# Patient Record
Sex: MALE | Race: WHITE | NOT HISPANIC OR LATINO | Employment: FULL TIME | URBAN - METROPOLITAN AREA
[De-identification: names, ages, dates, MRNs, and addresses within clinical notes are randomized per-mention and may not be internally consistent; named-entity substitution may affect disease eponyms.]

---

## 2018-07-11 ENCOUNTER — HOSPITAL ENCOUNTER (EMERGENCY)
Facility: HOSPITAL | Age: 43
Discharge: HOME | End: 2018-07-11

## 2018-07-11 VITALS
RESPIRATION RATE: 16 BRPM | OXYGEN SATURATION: 94 % | SYSTOLIC BLOOD PRESSURE: 148 MMHG | BODY MASS INDEX: 33.72 KG/M2 | DIASTOLIC BLOOD PRESSURE: 94 MMHG | WEIGHT: 235 LBS | HEART RATE: 100 BPM | TEMPERATURE: 98 F

## 2018-07-11 DIAGNOSIS — I10 ELEVATED BLOOD PRESSURE READING WITH DIAGNOSIS OF HYPERTENSION: Primary | ICD-10-CM

## 2018-07-11 DIAGNOSIS — M25.561 RIGHT KNEE PAIN: ICD-10-CM

## 2018-07-11 PROCEDURE — 99283 EMERGENCY DEPT VISIT LOW MDM: CPT | Mod: 25

## 2018-07-11 PROCEDURE — 29505 APPLICATION LONG LEG SPLINT: CPT | Mod: RT

## 2018-07-11 NOTE — ED PROVIDER NOTES
"Encounter Date: 7/11/2018  SCRIBE #1 NOTE: I, Elvis Dunn, am scribing for, and in the presence of,  Olu THAPA. I have scribed the entire note.        History     Chief Complaint   Patient presents with    Knee Pain     Pt states," My right knee has been hurting for ten days."     The patient is a 43 y.o. male with co-morbidities including: HTN, thyroid, Gout who presents to the ED with a complaint of right knee pain for 10 days.  He states it is painful constantly, aching, and worse with activity. He denies any shortness of breath,. Leg swelling, fevers, or altered sensation in the leg. He reports he was in the waiting room for his orthopedist office due to waiting too long and so he left and came directly here to be seen. The patient denies taking any pain medicine for this. He states he doesn't want to take any medicine today for this because he is on 4 medicines already and refuses to be taking a 5th medicine.  The patient was at an orthopedics office at [facility] when he got upset because he had to wait too long and decided to come to the ER.      The history is provided by the patient. No  was used.   Leg Pain    There was no injury mechanism. The incident occurred several days ago. The pain is present in the right knee. The quality of the pain is described as aching. The pain is at a severity of 8/10 (Patient describes his pain as an aching sharp pain). The pain has been constant since onset. Pertinent negatives include no numbness. He reports no foreign bodies present.     Review of patient's allergies indicates:  No Known Allergies  Past Medical History:   Diagnosis Date    Gout     Hypertension     Thyroid disease      Past Surgical History:   Procedure Laterality Date    EYE SURGERY      NOSE SURGERY      VASECTOMY       History reviewed. No pertinent family history.  Social History   Substance Use Topics    Smoking status: Current Every Day Smoker     Types: " Cigarettes    Smokeless tobacco: Never Used    Alcohol use Not on file     Review of Systems   Constitutional: Negative.  Negative for chills and fever.   HENT: Negative.  Negative for congestion, sinus pressure and sore throat.    Eyes: Negative.  Negative for pain and discharge.   Respiratory: Negative.  Negative for cough.    Cardiovascular: Negative.  Negative for chest pain.   Gastrointestinal: Negative.  Negative for abdominal pain, diarrhea, nausea and vomiting.   Genitourinary: Negative.  Negative for dysuria, genital sores, penile pain, scrotal swelling and testicular pain.   Musculoskeletal: Negative for joint swelling and neck pain.        Right knee pain   Skin: Negative.  Negative for color change, rash and wound.   Allergic/Immunologic: Negative.    Neurological: Negative for weakness and numbness.   Psychiatric/Behavioral: Negative.  Negative for behavioral problems, self-injury and suicidal ideas. The patient is not hyperactive.    All other systems reviewed and are negative.      Physical Exam     Initial Vitals [07/11/18 1524]   BP Pulse Resp Temp SpO2   (!) 149/104 (!) 111 16 98 °F (36.7 °C) 99 %      MAP       --         Physical Exam    Nursing note and vitals reviewed.  Constitutional: He appears well-developed and well-nourished.   HENT:   Head: Normocephalic and atraumatic.   Eyes: EOM are normal. Pupils are equal, round, and reactive to light.   Neck: Normal range of motion. Neck supple.   Cardiovascular: Normal rate, regular rhythm, normal heart sounds and intact distal pulses.   Pulmonary/Chest: Breath sounds normal. No respiratory distress. He has no wheezes.   Abdominal: Soft.   Musculoskeletal:        Right lower leg: He exhibits no tenderness, no bony tenderness, no swelling, no edema, no deformity and no laceration.        Left lower leg: He exhibits no tenderness, no bony tenderness, no swelling, no edema, no deformity and no laceration.   No apparent knee effusion, no anterior  knee tenderness, patella is in a normal position, he reports pain with touching the knee but it is distractible.  Bilateral calf circumferences are 44.5 cm each.  The patient's Wells criteria for DVT score is a 0.   Neurological: He is alert and oriented to person, place, and time.   Skin: Skin is warm and dry.   Psychiatric: He has a normal mood and affect. Thought content normal.         ED Course   Procedures  Labs Reviewed - No data to display       Imaging Results          X-Ray Knee 3 View Right (Final result)  Result time 07/11/18 15:44:08    Final result by Herb Acosta MD (07/11/18 15:44:08)                 Impression:      1. No acute displaced fracture or dislocation of the knee.      Electronically signed by: Herb Acosta MD  Date:    07/11/2018  Time:    15:44             Narrative:    EXAMINATION:  XR KNEE 3 VIEW RIGHT    CLINICAL HISTORY:  Pain in right knee    TECHNIQUE:  AP, lateral, and Merchant views of the right knee were performed.    COMPARISON:  None    FINDINGS:  Three views.    No acute displaced fracture or dislocation of the knee.  No radiopaque foreign body.  No large knee joint effusion.                                 Medical Decision Making:   Initial Assessment:   Right knee pain    Differential Diagnosis:   Fracture, dislocation, Baker cyst  ED Management:  Repeat pulse is 99 beats per min.  Patient reports that he did not take his blood pressure medicine today.  Patient denies headache or blurred vision or chest pain or dizziness.  Patient is instructed to take his blood pressure medicine when he returns home.  Patient will be referred to [facility] per his request and will contact them for a follow-up appointment at his convenience.  Patient refused any prescriptions for pain medicine.  Patient has a knee immobilizer that was applied in the ER as well as ice pack.  Patient is instructed to implement RICE.  Patient is instructed to return to the ER as needed if  symptoms worsen or fail to improve.  Patient verbalized understanding of discharge instructions and treatment plan.            Scribe Attestation:   Scribe #1: I performed the above scribed service and the documentation accurately describes the services I performed. I attest to the accuracy of the note.    Attending Attestation:           Physician Attestation for Scribe:  Physician Attestation Statement for Scribe #1: I, Tussaint Battley FNP, reviewed documentation, as scribed by Elvis Dunn in my presence, and it is both accurate and complete.                    Clinical Impression:   The primary encounter diagnosis was Elevated blood pressure reading with diagnosis of hypertension. A diagnosis of Right knee pain was also pertinent to this visit.                             Toussaint Battley III, FNP  07/11/18 2155

## 2020-10-05 ENCOUNTER — TELEPHONE (OUTPATIENT)
Dept: UROLOGY | Facility: CLINIC | Age: 45
End: 2020-10-05

## 2020-10-05 NOTE — TELEPHONE ENCOUNTER
Called to speak to pt about appt. appt scheduled for Wednesday at 930am. Requested pt call back for additional information.

## 2020-10-26 ENCOUNTER — TELEPHONE (OUTPATIENT)
Dept: UROLOGY | Facility: CLINIC | Age: 45
End: 2020-10-26

## 2020-10-26 NOTE — TELEPHONE ENCOUNTER
Spoke to pt and pts so. Informed appt scheduled with  on 11/2/20 at 930am. Procedure at [facility] 11/4/20 at 2pm pending labs/payment ok from  at office visit. Both verbalized understanding.

## 2020-10-27 ENCOUNTER — TELEPHONE (OUTPATIENT)
Dept: UROLOGY | Facility: CLINIC | Age: 45
End: 2020-10-27

## 2020-10-27 NOTE — TELEPHONE ENCOUNTER
"----- Message from Gilson Avendaño sent at 10/27/2020  1:06 PM CDT -----  Hello,    Test results received from [facility], in regards to patient's appt on 11/02/20. Results were scanned into  under "Dr. Michelet Parikh [facility] Results".    Please let me know if I can assist further.    Thank you,    Gilson Avendaño  [facility]     "

## 2020-11-02 ENCOUNTER — OFFICE VISIT (OUTPATIENT)
Dept: UROLOGY | Facility: CLINIC | Age: 45
End: 2020-11-02

## 2020-11-02 VITALS
BODY MASS INDEX: 38.5 KG/M2 | HEIGHT: 70 IN | DIASTOLIC BLOOD PRESSURE: 95 MMHG | SYSTOLIC BLOOD PRESSURE: 138 MMHG | HEART RATE: 75 BPM | WEIGHT: 268.94 LBS

## 2020-11-02 DIAGNOSIS — E29.1 TESTICULAR FAILURE: Primary | ICD-10-CM

## 2020-11-02 PROCEDURE — 99999 PR PBB SHADOW E&M-EST. PATIENT-LVL III: ICD-10-PCS | Mod: PBBFAC,,, | Performed by: UROLOGY

## 2020-11-02 PROCEDURE — 99204 OFFICE O/P NEW MOD 45 MIN: CPT | Mod: S$PBB,,, | Performed by: UROLOGY

## 2020-11-02 PROCEDURE — 99213 OFFICE O/P EST LOW 20 MIN: CPT | Mod: PBBFAC | Performed by: UROLOGY

## 2020-11-02 PROCEDURE — 99999 PR PBB SHADOW E&M-EST. PATIENT-LVL III: CPT | Mod: PBBFAC,,, | Performed by: UROLOGY

## 2020-11-02 PROCEDURE — 99204 PR OFFICE/OUTPT VISIT, NEW, LEVL IV, 45-59 MIN: ICD-10-PCS | Mod: S$PBB,,, | Performed by: UROLOGY

## 2020-11-02 RX ORDER — OMEPRAZOLE 10 MG/1
10 CAPSULE, DELAYED RELEASE ORAL
COMMUNITY

## 2020-11-02 NOTE — PROGRESS NOTES
HISTORY OF PRESENT ILLNESS:    Mr. Aly is a 45 y.o. male who is engaged to his fiancee. Mr. Aly underwent an elective bilateral vasectomy 16 years ago without complications. Prior to that he had achieved 0 pregnancies without difficulty. The couple is now interested in achieving a pregnancy.  He had a GLASGOW on him in 2016 by Dr. Yap.  Unsure of the side.  I saw him back in 2016, but he didn't follow back up with me for surgery.    Did IVF with 1 frozen embryo.  Has not transferred.    His fiancee is 40 years old. She has regular menses and has had 0 prior pregnancies. She sees Dr. Porter.    She has no other underlying gynecological problems other than low ovarian reserve.    The couple has undergone in-vitro fertilization (IVF) as above.  No intrauterine insemination (IUI), or other assisted reproductive techniques.    Mr. Aly denies any history of exposure to harmful chemicals, toxins, or radiation. No history of urological trauma or injuries. No history of post-pubertal mumps, testicular torsion, epididymitis, prostatitis, or sexually transmitted diseases.    REVIEW OF SYSTEMS:    Otherwise, the patient denies any history of headache, blurred vision, diabetes, fever, nausea, vomiting, chills, flank discomfort, abdominal pain, bleeding per rectum, cough, SOB, recent loss of consciousness, recent mental status changes, seizures, dizziness, or upper/lower extremity weakness.    PATIENT HISTORY:    Past Medical History:   Diagnosis Date    Gout     Hypertension     Thyroid disease        Past Surgical History:   Procedure Laterality Date    EYE SURGERY      NOSE SURGERY      VASECTOMY         Social History     Socioeconomic History    Marital status: Single     Spouse name: Not on file    Number of children: Not on file    Years of education: Not on file    Highest education level: Not on file   Occupational History    Not on file   Social Needs    Financial resource strain: Not on file     Food insecurity     Worry: Not on file     Inability: Not on file    Transportation needs     Medical: Not on file     Non-medical: Not on file   Tobacco Use    Smoking status: Current Every Day Smoker     Types: Cigarettes    Smokeless tobacco: Never Used   Substance and Sexual Activity    Alcohol use: Not on file    Drug use: Not on file    Sexual activity: Not on file   Lifestyle    Physical activity     Days per week: Not on file     Minutes per session: Not on file    Stress: Not on file   Relationships    Social connections     Talks on phone: Not on file     Gets together: Not on file     Attends Hindu service: Not on file     Active member of club or organization: Not on file     Attends meetings of clubs or organizations: Not on file     Relationship status: Not on file   Other Topics Concern    Not on file   Social History Narrative    Not on file       History reviewed. No pertinent family history.    Review of patient's allergies indicates:  No Known Allergies      Current Outpatient Medications:     allopurinol (ZYLOPRIM) 300 MG tablet, , Disp: , Rfl:     levothyroxine (SYNTHROID) 125 MCG tablet, , Disp: , Rfl:     lisinopril-hydrochlorothiazide (PRINZIDE,ZESTORETIC) 20-25 mg Tab, , Disp: , Rfl:     omeprazole (PRILOSEC) 10 MG capsule, Take 10 mg by mouth., Disp: , Rfl:     pravastatin (PRAVACHOL) 40 MG tablet, , Disp: , Rfl:       PHYSICAL EXAM:    General appearance: Well-developed, well-nourished male in no apparent distress.    Skin: Normal.    Mental status: Alert and oriented. Cooperative with normal affect.    Neuro: Motor and sensory exams grossly intact.    HEENT: Normocephalic, atraumatic.    Neck: Normal. No evidence of lymphadenopathy. No thyroidomegaly.    Chest: No gynecomastia.    Lungs: Clear to auscultation bilaterally.    Heart: Regular rate and rhythm. No murmurs.    Abdomen: Soft, non-distended, non-tender. No masses or organomegaly. Bowel sounds are normal.  Bladder is not palpable. No evidence of inguinal hernia.    Back: No evidence of flank discomfort.    Genitourinary: Penis is normal with no evidence of plaques or induration. Urethral meatus is normal. Scrotum is normal. Testes are descended bilaterally with no evidence of abnormal masses or tenderness. Previous vasectomy site is  palpable in the mid portion of the scrotum bilaterally.    Extremities: No cyanosis, clubbing, or edema.    IMPRESSION:    S/p bilateral vasectomy    PLAN:    1. Recommend repeat GLASGOW/TESE.    2. Risks and benefits of epididymal sperm aspiration/TESE  were discussed in detail today including failure to retrieve sperm, infection, bleeding, hypogonadism, pain, loss of testicle, need for IVF with subsequent failure to achieve a pregnancy.

## 2020-11-02 NOTE — LETTER
November 2, 2020        Nilesh Bartlett MD  [address]  [address]  [place] [address]             [facility] [address] [facility]  [address]  [place] [address]  Phone: [phone]   Patient: Graeme Aly   MR Number: [id number]   YOB: 1975   Date of Visit: 11/2/2020       Dear Dr. Bartlett:    Thank you for referring Graeme Aly to me for evaluation. Attached you will find relevant portions of my assessment and plan of care.    If you have questions, please do not hesitate to call me. I look forward to following Graeme Aly along with you.    Sincerely,      Michelet Parikh MD            CC  No Recipients    Enclosure